# Patient Record
Sex: FEMALE | Race: BLACK OR AFRICAN AMERICAN
[De-identification: names, ages, dates, MRNs, and addresses within clinical notes are randomized per-mention and may not be internally consistent; named-entity substitution may affect disease eponyms.]

---

## 2023-10-24 ENCOUNTER — HOSPITAL ENCOUNTER (EMERGENCY)
Dept: HOSPITAL 97 - ER | Age: 23
Discharge: HOME | End: 2023-10-24
Payer: SELF-PAY

## 2023-10-24 DIAGNOSIS — D72.829: ICD-10-CM

## 2023-10-24 DIAGNOSIS — N83.299: ICD-10-CM

## 2023-10-24 DIAGNOSIS — N39.0: Primary | ICD-10-CM

## 2023-10-24 LAB
ALBUMIN SERPL BCP-MCNC: 3.9 G/DL (ref 3.4–5)
ALP SERPL-CCNC: 78 U/L (ref 45–117)
ALT SERPL W P-5'-P-CCNC: 13 U/L (ref 13–56)
AST SERPL W P-5'-P-CCNC: 12 U/L (ref 15–37)
BUN BLD-MCNC: 10 MG/DL (ref 7–18)
GLUCOSE SERPLBLD-MCNC: 112 MG/DL (ref 74–106)
HCT VFR BLD CALC: 33.8 % (ref 36–45)
LIPASE SERPL-CCNC: 17 U/L (ref 13–75)
LYMPHOCYTES # SPEC AUTO: 1.2 K/UL (ref 0.7–4.9)
MCV RBC: 78.2 FL (ref 80–100)
MORPHOLOGY BLD-IMP: (no result)
PMV BLD: 7.6 FL (ref 7.6–11.3)
POTASSIUM SERPL-SCNC: 4 MEQ/L (ref 3.5–5.1)
RBC # BLD: 4.32 M/UL (ref 3.86–4.86)
WBC # BLD AUTO: 25.2 THOU/UL (ref 4.3–10.9)

## 2023-10-24 PROCEDURE — 85025 COMPLETE CBC W/AUTO DIFF WBC: CPT

## 2023-10-24 PROCEDURE — 87088 URINE BACTERIA CULTURE: CPT

## 2023-10-24 PROCEDURE — 83690 ASSAY OF LIPASE: CPT

## 2023-10-24 PROCEDURE — 36415 COLL VENOUS BLD VENIPUNCTURE: CPT

## 2023-10-24 PROCEDURE — 87086 URINE CULTURE/COLONY COUNT: CPT

## 2023-10-24 PROCEDURE — 74177 CT ABD & PELVIS W/CONTRAST: CPT

## 2023-10-24 PROCEDURE — 80053 COMPREHEN METABOLIC PANEL: CPT

## 2023-10-24 PROCEDURE — 76830 TRANSVAGINAL US NON-OB: CPT

## 2023-10-24 PROCEDURE — 81001 URINALYSIS AUTO W/SCOPE: CPT

## 2023-10-24 PROCEDURE — 81025 URINE PREGNANCY TEST: CPT

## 2023-10-24 NOTE — EDPHYS
Physician Documentation                                                                           

 [facility]                                                                 

Name: Marybel Alamo                                                                                

Age: 22 yrs                                                                                       

Sex: Female                                                                                       

: 2000                                                                                   

MRN: [id number]                                                                                   

Arrival Date: 10/24/2023                                                                          

Time: 17:35                                                                                       

Account#: [id number]                                                                            

Bed 14                                                                                            

Private MD:                                                                                       

ED Physician Pramod Calhoun                                                                      

HPI:                                                                                              

10/24                                                                                             

21:57 This 22 yrs old Black Female presents to ER via Wheelchair with complaints of Abdominal kb  

      Pain.                                                                                       

21:57 Patient is a 22-year-old female who presents for abdominal pain that started this       kb  

      morning. States pain started in right lower quadrant is moved to the middle of her          

      abdomen. Reports nausea, denies vomiting, diarrhea and fever. Patient states she has        

      had this pain before but it resolved on its own and she did not get it checked out..        

                                                                                                  

Historical:                                                                                       

- Allergies:                                                                                      

17:42 No Known Allergies;                                                                     cm10

- Home Meds:                                                                                      

17:42 None [Active];                                                                          cm10

- PMHx:                                                                                           

17:42 None;                                                                                   cm10

- PSHx:                                                                                           

17:42 None;                                                                                   cm10

                                                                                                  

- Immunization history:: Adult Immunizations up to date.                                          

- Social history:: Smoking status: Patient reports the use of cigarette tobacco                   

  products, smokes one pack cigarettes per day.                                                   

                                                                                                  

                                                                                                  

ROS:                                                                                              

21:57 Constitutional: Negative for fever, chills, and weight loss,                            kb  

21:57 Abdomen/GI: Positive for abdominal pain, nausea,                                            

21:57 All other systems are negative,                                                             

                                                                                                  

Exam:                                                                                             

21:57 Constitutional:  This is a well developed, well nourished patient who is awake, alert,  kb  

      and in no acute distress. Head/Face:  Normocephalic, atraumatic. ENT:  Moist Mucous         

      membranes Cardiovascular:  Regular rate  Respiratory:  Respirations even and unlabored.     

      No increased work of breathing. Talking in full sentences Skin:  Warm, dry with normal      

      turgor.  Normal color. MS/ Extremity:  Pulses equal, no cyanosis.  Neurovascular            

      intact.  Full, normal range of motion. Neuro:  Awake and alert, GCS 15, oriented to         

      person, place, time, and situation. Moves all extremities. Normal gait.                     

21:57 Abdomen/GI: Inspection: abdomen appears normal, Bowel sounds: normal, Palpation:            

      moderate abdominal tenderness, in all quadrants,                                            

                                                                                                  

Vital Signs:                                                                                      

17:41  / 85; Pulse 78; Resp 18; Temp 98.8; Pulse Ox 100% on R/A; Weight 65.77 kg;       cm10

      Height 5 ft. 6 in. ; Pain 10/10;                                                            

18:00  / 93; Pulse 75; Resp 18; Pulse Ox 99% ;                                          ko1 

19:09  / 76; Pulse 91; Resp 18; Pulse Ox 100% ;                                         jw7 

20:20  / 69; Pulse 88; Resp 16; Pulse Ox 100% ;                                         jw7 

22:04  / 56; Pulse 61; Resp 16; Pulse Ox 100% on R/A;                                   kl  

17:41 Body Mass Index 23.40 (65.77 kg, 167.64 cm)                                             cm10

17:41 Pain Scale: Adult                                                                       cm10

                                                                                                  

MDM:                                                                                              

17:40 Patient medically screened.                                                             kb  

21:58 Differential diagnosis: appendicitis, bowel obstruction, non-specific abd pain, Ovarian kb  

      Torsion, Ovarian cyst. Data reviewed: vital signs, nurses notes. Counseling: I had a        

      detailed discussion with the patient and/or guardian regarding the historical points,       

      exam findings, and any diagnostic results supporting the discharge/admit diagnosis, lab     

      results, radiology results, the need for outpatient follow up, an OB/Gyne specialist,       

      to return to the emergency department if symptoms worsen or persist or if there are any     

      questions or concerns that arise at home. ED course: Patient request to be discharged       

      at this time. States she is feeling better and wants to go home. Educated that we do        

      not have the radiologist reading of ultrasound at this time but patient request to go       

      home now..                                                                                  

                                                                                                  

10/24                                                                                             

17:43 Order name: CBC with Diff; Complete Time: 18:57                                         kb  

10/24                                                                                             

17:43 Order name: CMP; Complete Time: 19:14                                                   kb  

10/24                                                                                             

17:43 Order name: Lipase; Complete Time: 19:14                                                kb  

10/24                                                                                             

17:43 Order name: Pregnancy Test, Urine; Complete Time: 18:42                                 kb  

10/24                                                                                             

17:43 Order name: Urinalysis w/ reflexes; Complete Time: 18:42                                kb  

10/24                                                                                             

18:41 Order name: Urine Culture                                                               EDMS

10/24                                                                                             

18:55 Order name: Manual Differential; Complete Time: 18:57                                   EDMS

10/24                                                                                             

17:43 Order name: CT Abd/Pelvis - IV Contrast Only; Complete Time: 20:38                      kb  

10/24                                                                                             

20:39 Order name: US Transvaginal Study (Probe)                                               kb  

10/24                                                                                             

17:43 Order name: IV Saline Lock; Complete Time: 18:16                                        kb  

10/24                                                                                             

17:43 Order name: Labs collected and sent; Complete Time: 18:16                               kb  

                                                                                                  

Administered Medications:                                                                         

18:17 Drug: NS 0.9% IV 1000 ml IV at 1 bolus Per protocol; 1000 mL bolus Route: IV; Rate: 1   ko1 

      bolus; Site: left antecubital;                                                              

18:17 Drug: TORadol - Ketorolac IVP 15 mg IVP once Route: IVP; Site: left antecubital;        ko1 

18:17 Drug: Ondansetron IVP 4 mg IVP once; over 2 minutes Route: IVP; Site: left antecubital; ko1 

                                                                                                  

                                                                                                  

Disposition Summary:                                                                              

10/24/23 21:57                                                                                    

Discharge Ordered                                                                                 

 Notes:       Location: Home                                                                        
  kb

      Condition: Stable                                                                       kb  

      Diagnosis                                                                                   

        - Other ovarian cysts                                                                 kb  

        - UTI/ Urinary tract infection, site not specified                                    kb  

        - Elevated white blood cell count                                                     kb  

      Followup:                                                                               kb  

        - With: Emergency Department                                                               

        - When: As needed                                                                          

        - Reason: Worsening of condition                                                           

      Followup:                                                                               kb  

        - With: Private Physician                                                                  

        - When: 2 - 3 days                                                                         

        - Reason: Recheck today's complaints, Continuance of care, Re-evaluation by your           

      physician                                                                                   

      Discharge Instructions:                                                                     

        - Discharge Summary Sheet                                                             kb  

        - Urinary Tract Infection, Adult, Easy-to-Read                                        kb  

        - Abdominal Pain, Adult, Easy-to-Read                                                 kb  

        - Ovarian Cyst, Easy-to-Read                                                          kb  

      Forms:                                                                                      

        - Medication Reconciliation Form                                                      kb  

        - Thank You Letter                                                                    kb  

        - Antibiotic Education                                                                kb  

        - Prescription Opioid Use                                                             kb  

        - Patient Portal Instructions                                                         kb  

        - Leadership Thank You Letter                                                         kb  

      Prescriptions:                                                                              

        - Macrobid 100 mg Oral Capsule                                                             

            - take 1 capsule ORAL route every 12 hours for 10 days; 20 capsule; Refills: 0,   kb  

      Product Selection Permitted                                                                 

Signatures:                                                                                       

Dispatcher [employer]                           Fariha Coyle, FNP-C                 FNP-Sada Lundberg RN                       RN   ko1                                                  

Jaci Quiros RN                  RN   cm10                                                 

                                                                                                  

Corrections: (The following items were deleted from the chart)                                    

21:59 21:57 Abdominal pain, Generalized kb                                                    kb  

                                                                                                  

**************************************************************************************************

## 2023-10-24 NOTE — RAD REPORT
EXAM DESCRIPTION:  CT - Abdomen   Pelvis W Contrast - 10/24/2023 7:36 pm

 

CLINICAL HISTORY:  ABD PAIN

 

COMPARISON:  No comparisons

 

TECHNIQUE:  Thin cut axial CT imaging of the abdomen and pelvis was performed following intravenous a
dministration of 100 mL Isovue 300. Multiplanar reformats were generated and reviewed.

 

All CT scans are performed using dose optimization technique as appropriate and may include automated
 exposure control or mA/KV adjustment according to patient size.

 

FINDINGS:  No suspicious findings in the lung bases.

 

The liver, spleen, adrenal glands, and pancreas show no suspicious findings. Gallbladder and biliary 
tree are also without suspicious finding.

 

Symmetric renal function is seen with no hydronephrosis or suspicious renal mass.

 

Large septated cystic lesions of both ovaries, measuring 10.1 x 5.7 cm on the right, and 10.5 x 5.1 c
m on the left. No dilated bowel loops or bowel wall thickening. No free air, free fluid or inflammato
ry stranding. No hernia, mass or bulky lymphadenopathy. The urinary bladder is without significant fi
nding.

 

No suspicious bony findings.

 

 

IMPRESSION:  Bilateral complex cystic lesions of both ovaries, exceeding 10 cm each. These could be f
unctional/physiologic. Possibility of cystic neoplasm cannot be entirely excluded. Additional evaluat
ion by pelvic ultrasound or MRI is recommended for better characterization.

## 2023-10-24 NOTE — RAD REPORT
EXAM DESCRIPTION:  US - Transvaginal Study Probe - 10/24/2023 9:38 pm

 

CLINICAL HISTORY:  ABD PAIN

 

COMPARISON:  Abdomen   Pelvis W Contrast dated 10/24/2023

 

TECHNIQUE:    Sonographic grayscale and color flow images of the pelvis were obtained.

 

FINDINGS:  The uterus is normal in size, shape and echotexture. The uterus measures 6.1 cm in length.


 

The endometrial stripe measures 1 mm, normal.

 

Both ovaries are markedly enlarged, with numerous multiloculated cystic lesions with septations.  The
 right ovary measures 9.6 x 7.0 x 7.1 cm.  The left ovary measures 9.6 x 5.3 x 4.7 cm. Cysts demonstr
ate mild complexity, with most pronounced layering debris within the dominant cyst within the right o
vary, occupying most of the ovary. No septal thickening, mural solid nodules, or other solid lesions.


 

Normal Doppler blood flow was demonstrated to both ovaries.

 

No significant pelvic ascites.

 

IMPRESSION:  Bilateral large multiloculated ovarian cysts with septations, with some debris, suggesti
ng hemorrhagic cysts. Other possibilities such as physiologic cysts and endometriomas are probably le
ss likely. Given the size of the lesions, short-term follow-up sonographic evaluation in 6-10 weeks i
s recommended.

## 2023-10-24 NOTE — ER
Nurse's Notes                                                                                     

 [facility]                                                                 

Name: Marybel Alamo                                                                                

Age: 22 yrs                                                                                       

Sex: Female                                                                                       

: 2000                                                                                   

MRN: [id number]                                                                                   

Arrival Date: 10/24/2023                                                                          

Time: 17:35                                                                                       

Account#: [id number]                                                                            

Bed 14                                                                                            

Private MD:                                                                                       

Diagnosis: Other ovarian cysts;UTI/ Urinary tract infection, site not specified;Elevated white    

  blood cell count                                                                                

                                                                                                  

Presentation:                                                                                     

10/24                                                                                             

17:41 Chief complaint: Patient states: "My stomach started hurting really bad this morning.   cm10

      The pain started on the right side and now its in the middle. I feel like i'm going to      

      throw up.". Coronavirus screen: At this time, the client does not indicate any symptoms     

      associated with coronavirus-19. Ebola Screen: No symptoms or risks identified at this       

      time. Initial Sepsis Screen: Does the patient meet any 2 criteria? No. Patient's            

      initial sepsis screen is negative. Does the patient have a suspected source of              

      infection? No. Patient's initial sepsis screen is negative. Risk Assessment: Do you         

      want to hurt yourself or someone else? Patient reports no desire to harm self or            

      others. Onset of symptoms was 2023.                                             

17:41 Method Of Arrival: Wheelchair                                                           cm10

17:41 Acuity: MELINA 3                                                                           cm10

                                                                                                  

Triage Assessment:                                                                                

17:43 General: Appears uncomfortable, Behavior is anxious, crying. Pain: Complains of pain in cm10

      abdomen Pain currently is 10 out of 10 on a pain scale. EENT: No signs and/or symptoms      

      were reported regarding the EENT system. Neuro: Robles Agitation-Sedation Scale           

      (RASS): 0 - Alert and Calm Level of Consciousness is awake, alert, obeys commands,          

      Oriented to person, place, time, situation, Appropriate for age. Cardiovascular:            

      Patient's skin is warm and dry. Respiratory: Airway is patent Respiratory effort is         

      even, unlabored, Respiratory pattern is regular, symmetrical. GI: Abdomen is flat,          

      non-distended, Abdomen is tender to palpation X 4 quads. GI: Reports nausea. : No         

      signs and/or symptoms were reported regarding the genitourinary system. Derm: Skin is       

      pink, warm \T\ dry. Musculoskeletal: Range of motion: intact in all extremities.            

                                                                                                  

Historical:                                                                                       

- Allergies:                                                                                      

17:42 No Known Allergies;                                                                     cm10

- Home Meds:                                                                                      

17:42 None [Active];                                                                          cm10

- PMHx:                                                                                           

17:42 None;                                                                                   cm10

- PSHx:                                                                                           

17:42 None;                                                                                   cm10

                                                                                                  

- Immunization history:: Adult Immunizations up to date.                                          

- Social history:: Smoking status: Patient reports the use of cigarette tobacco                   

  products, smokes one pack cigarettes per day.                                                   

                                                                                                  

                                                                                                  

Screenin:00 [facility] ED Fall Risk Assessment (Adult) History of falling in the last 3 months,       ko1 

      including since admission No falls in past 3 months (0 pts) Confusion or Disorientation     

      No (0 pts) Intoxicated or Sedated No (0 pts) Impaired Gait No (0 pts) Mobility Assist       

      Device Used No (0 pt) Altered Elimination No (0 pt) Score/Fall Risk Level 0 - 2 = Low       

      Risk Oriented to surroundings, Maintained a safe environment, Educated pt \T\ family on     

      fall prevention, incl call for assistance when getting out of bed, Assessed \T\             

      reinforced patient's understanding of fall precautions, Provided non-skid footwear,         

      Hourly rounding (assess needs \T\ fall precautionary measures) done, Used ambulatory aids   

      as needed (educated on \T\ assisted with), Used gait belt as appropriate. Abuse screen:     

      Denies threats or abuse. Denies injuries from another. Nutritional screening: No            

      deficits noted. Tuberculosis screening: No symptoms or risk factors identified.             

                                                                                                  

Assessment:                                                                                       

18:00 General: Appears distressed, uncomfortable, Behavior is cooperative, appropriate for    ko1 

      age. Pain: Complains of pain in abdomen. Neuro: No deficits noted. Cardiovascular: No       

      deficits noted. Respiratory: No deficits noted. GI: Bowel sounds present X 4 quads.         

      Reports lower abdominal pain, nausea.                                                       

18:00 GI: Abdomen is flat, non-distended. GI: Abd is soft X 4 quads. : No deficits noted.   ko1 

      EENT: No deficits noted. Derm: No deficits noted. Musculoskeletal: No deficits noted.       

19:15 Reassessment: Patient appears in no apparent distress at this time. Patient and/or      jw7 

      family updated on plan of care and expected duration. Pain level reassessed. Patient is     

      alert, oriented x 3, equal unlabored respirations, skin warm/dry/pink. Patient states       

      feeling better.                                                                             

20:55 Reassessment: Patient appears in no apparent distress at this time. No changes from     jw7 

      previously documented assessment. Patient and/or family updated on plan of care and         

      expected duration. Pain level reassessed. Patient is alert, oriented x 3, equal             

      unlabored respirations, skin warm/dry/pink.                                                 

                                                                                                  

Vital Signs:                                                                                      

17:41  / 85; Pulse 78; Resp 18; Temp 98.8; Pulse Ox 100% on R/A; Weight 65.77 kg;       cm10

      Height 5 ft. 6 in. ; Pain 10/10;                                                            

18:00  / 93; Pulse 75; Resp 18; Pulse Ox 99% ;                                          ko1 

19:09  / 76; Pulse 91; Resp 18; Pulse Ox 100% ;                                         jw7 

20:20  / 69; Pulse 88; Resp 16; Pulse Ox 100% ;                                         jw7 

22:04  / 56; Pulse 61; Resp 16; Pulse Ox 100% on R/A;                                   kl  

17:41 Body Mass Index 23.40 (65.77 kg, 167.64 cm)                                             cm10

17:41 Pain Scale: Adult                                                                       cm10

                                                                                                  

ED Course:                                                                                        

17:38 Patient arrived in ED.                                                                  kj1 

17:38 Fariha Carter FNP-C is PHCP.                                                        kb  

17:38 Pramod Calhoun MD is Attending Physician.                                             kb  

17:42 Triage completed.                                                                       cm10

17:43 Arm band placed on.                                                                     cm10

17:45 Sada Donaldson, RN is Primary Nurse.                                                     ko1 

18:00 Patient has correct armband on for positive identification. Placed in gown. Bed in low  ko1 

      position. Call light in reach. Side rails up X2. Provided Education on: na. Pulse ox        

      on. NIBP on. Door closed. Noise minimized. Lights dimmed. Warm blanket given.               

18:00 Inserted saline lock: 22 gauge in left antecubital area, using aseptic technique. Blood ko1 

      collected.                                                                                  

18:16 CBC with Diff Sent.                                                                     ko1 

18:16 CMP Sent.                                                                               ko1 

18:16 Lipase Sent.                                                                            ko1 

19:38 CT Abd/Pelvis - IV Contrast Only In Process Unspecified.                                EDMS

20:11 Primary Nurse role handed off by Sada Donaldson, RN                                      as6 

20:54 HernandezsEmily, RN is Primary Nurse.                                                       jw7 

21:30 No apparent distress. Resting quietly.                                                  kl  

21:40 US Transvaginal Study (Probe) In Process Unspecified.                                   EDMS

22:04 No provider procedures requiring assistance completed. IV discontinued, intact,         kl  

      bleeding controlled, No redness/swelling at site. Pressure dressing applied.                

                                                                                                  

Administered Medications:                                                                         

18:17 Drug: NS 0.9% IV 1000 ml IV at 1 bolus Per protocol; 1000 mL bolus Route: IV; Rate: 1   ko1 

      bolus; Site: left antecubital;                                                              

18:17 Drug: TORadol - Ketorolac IVP 15 mg IVP once Route: IVP; Site: left antecubital;        ko1 

18:17 Drug: Ondansetron IVP 4 mg IVP once; over 2 minutes Route: IVP; Site: left antecubital; ko1 

                                                                                                  

                                                                                                  

Medication:                                                                                       

18:00 VIS not applicable for this client.                                                     ko1 

                                                                                                  

Outcome:                                                                                          

21:57 Discharge ordered by MD.                                                                garima  

22:05 Discharged to home ambulatory,                                                          frankie  

22:05 Condition: stable                                                                           

22:05 Discharge instructions given to patient, Instructed on discharge instructions, follow       

      up and referral plans. medication usage, Demonstrated understanding of instructions,        

      follow-up care, medications, Prescriptions given X 1,                                       

22:05 Patient left the ED.                                                                    frankie  

                                                                                                  

Signatures:                                                                                       

Dispatcher [employer]                           EDFariha Shay, FNP-C                 FNP-CkPiper Spangler, RN                     RN   Martha Howard1                                                  

Marquis Burnett RN RN   as6                                                  

Emily Obregon RN                         RN   jw7                                                  

Sada Donaldson RN                       RN   ko1                                                  

Jaci Quiros, RN                  RN   cm10                                                 

                                                                                                  

**************************************************************************************************

## 2024-08-29 ENCOUNTER — HOSPITAL ENCOUNTER (EMERGENCY)
Dept: HOSPITAL 97 - ER | Age: 24
Discharge: ANOTHER HOSPITAL | End: 2024-08-29
Payer: COMMERCIAL

## 2024-08-29 VITALS — SYSTOLIC BLOOD PRESSURE: 108 MMHG | DIASTOLIC BLOOD PRESSURE: 64 MMHG

## 2024-08-29 VITALS — TEMPERATURE: 98 F

## 2024-08-29 VITALS — OXYGEN SATURATION: 100 %

## 2024-08-29 DIAGNOSIS — A41.9: ICD-10-CM

## 2024-08-29 DIAGNOSIS — N73.9: Primary | ICD-10-CM

## 2024-08-29 LAB
ALBUMIN SERPL BCP-MCNC: 2.5 G/DL (ref 3.4–5)
ALBUMIN/GLOB SERPL: 0.4 {RATIO} (ref 1.1–1.8)
ALP SERPL-CCNC: 172 U/L (ref 45–117)
ALT SERPL W P-5'-P-CCNC: 48 U/L (ref 13–56)
ANION GAP SERPL CALC-SCNC: 8 MEQ/L (ref 5–15)
ANISOCYTOSIS BLD QL: (no result)
AST SERPL W P-5'-P-CCNC: 48 U/L (ref 15–37)
BLD SMEAR INTERP: (no result)
BUN BLD-MCNC: 8 MG/DL (ref 7–18)
GLOBULIN SER CALC-MCNC: 7.1 G/DL (ref 2.3–3.5)
GLUCOSE SERPLBLD-MCNC: 100 MG/DL (ref 74–106)
HCT VFR BLD CALC: 24.8 % (ref 36–45)
HGB BLD-MCNC: 7.6 G/DL (ref 12–15)
HYPOCHROMIA BLD QL: (no result)
LIPASE SERPL-CCNC: 24 U/L (ref 13–75)
LYMPHOCYTES # SPEC AUTO: 3.4 K/UL (ref 0.7–4.9)
MCH RBC QN AUTO: 21.9 PG (ref 27–35)
MCHC RBC AUTO-ENTMCNC: 30.6 G/DL (ref 32–36)
MCV RBC: 71.8 FL (ref 80–100)
MICROCYTES BLD QL SMEAR: (no result)
MORPHOLOGY BLD-IMP: (no result)
NRBC # BLD: 0 10*3/UL (ref 0–0)
NRBC BLD AUTO-RTO: 0 % (ref 0–0)
PMV BLD: 6.7 FL (ref 7.6–11.3)
POLYCHROMASIA BLD QL SMEAR: (no result)
POTASSIUM SERPL-SCNC: 4 MEQ/L (ref 3.5–5.1)
RBC # BLD: 3.46 M/UL (ref 3.86–4.86)
UA COMPLETE W REFLEX CULTURE PNL UR: (no result)
UA DIPSTICK W REFLEX MICRO PNL UR: (no result)
WBC # BLD AUTO: 16 THOU/UL (ref 4.3–10.9)

## 2024-08-29 PROCEDURE — 83690 ASSAY OF LIPASE: CPT

## 2024-08-29 PROCEDURE — 93005 ELECTROCARDIOGRAM TRACING: CPT

## 2024-08-29 PROCEDURE — 87040 BLOOD CULTURE FOR BACTERIA: CPT

## 2024-08-29 PROCEDURE — 83605 ASSAY OF LACTIC ACID: CPT

## 2024-08-29 PROCEDURE — 80053 COMPREHEN METABOLIC PANEL: CPT

## 2024-08-29 PROCEDURE — 81025 URINE PREGNANCY TEST: CPT

## 2024-08-29 PROCEDURE — 85025 COMPLETE CBC W/AUTO DIFF WBC: CPT

## 2024-08-29 PROCEDURE — 36415 COLL VENOUS BLD VENIPUNCTURE: CPT

## 2024-08-29 PROCEDURE — 99285 EMERGENCY DEPT VISIT HI MDM: CPT

## 2024-08-29 PROCEDURE — 74177 CT ABD & PELVIS W/CONTRAST: CPT

## 2024-08-29 PROCEDURE — 81001 URINALYSIS AUTO W/SCOPE: CPT

## 2024-08-29 PROCEDURE — 96361 HYDRATE IV INFUSION ADD-ON: CPT

## 2024-08-29 PROCEDURE — 96365 THER/PROPH/DIAG IV INF INIT: CPT

## 2024-08-29 NOTE — EDPHYS
Physician Documentation                                                                           

 [facility]                                                                 

Name: Marybel Alamo                                                                                

Age: 23 yrs                                                                                       

Sex: Female                                                                                       

: 2000                                                                                   

MRN: [id number]                                                                                   

Arrival Date: 2024                                                                          

Time: 07:43                                                                                       

Account#: [id number]                                                                            

Bed 5                                                                                             

Private MD:                                                                                       

ED Physician oJhn Danielson                                                                     

HPI:                                                                                              

                                                                                             

08:15 This 23 yrs old Black Female presents to ER via EMS with complaints of Pelvic Pain.     rt  

08:15 Patient presents to the ED with lower abdominal pain and pelvic pain for more than a    rt  

      month. Patient was seen in the ED about a month ago, was diagnosed with cystic              

      structure, infection versus neoplasm. Was prescribed antibiotics, left. States that she     

      initially improved, however, is worsened. Reports significant weight loss, had a near       

      syncopal episode today. Denies acute complaints, symptoms are moderate in severity, no      

      other aggravating relieving factors.                                                        

                                                                                                  

OB/GYN:                                                                                           

08:33 LMP N/A - Irregular menses, Not pregnant                                                dd2 

                                                                                                  

Historical:                                                                                       

- Allergies:                                                                                      

07:54 No Known Allergies;                                                                     ll1 

- Home Meds:                                                                                      

07:54 None [Active];                                                                          ll1 

- PMHx:                                                                                           

07:54 Ovarian cyst;                                                                           ll1 

- PSHx:                                                                                           

07:54 ovarian cyst surgery;                                                                   ll1 

                                                                                                  

- Immunization history:: Adult Immunizations up to date.                                          

- Infectious Disease History:: Denies.                                                            

- Social history:: Smoking status: Patient reports the use of cigarette tobacco                   

  products, smokes one-half pack cigarettes per day.                                              

- Family history:: not pertinent.                                                                 

                                                                                                  

                                                                                                  

ROS:                                                                                              

08:15 Constitutional: Negative for fever, chills, and weight loss, Cardiovascular: Negative   rt  

      for chest pain, palpitations, and edema, Respiratory: Negative for shortness of breath,     

      cough, wheezing, and pleuritic chest pain, MS/Extremity: Negative for injury and            

      deformity, Skin: Negative for injury, rash, and discoloration,                              

08:15 Constitutional: Positive for malaise, weight loss,                                          

08:15 Abdomen/GI: Positive for abdominal pain, nausea,                                            

08:15 Neuro: Positive for near syncope, Negative for headache,                                    

                                                                                                  

Exam:                                                                                             

08:15 Constitutional:  This is a well developed, well nourished patient who is awake, alert,  rt  

      and in no acute distress. Chest/axilla:  Normal chest wall appearance and motion.           

      Nontender with no deformity.  No lesions are appreciated. Cardiovascular:  Regular rate     

      and rhythm with a normal S1 and S2.  No gallops, murmurs, or rubs.  Normal PMI, no JVD.     

       No pulse deficits. Respiratory:  Lungs have equal breath sounds bilaterally, clear to      

      auscultation and percussion.  No rales, rhonchi or wheezes noted.  No increased work of     

      breathing, no retractions or nasal flaring. Skin:  Warm, dry with normal turgor.            

      Normal color with no rashes, no lesions, and no evidence of cellulitis. MS/ Extremity:      

      Pulses equal, no cyanosis.  Neurovascular intact.  Full, normal range of motion. Neuro:     

       Awake and alert, GCS 15, oriented to person, place, time, and situation.  Cranial          

      nerves II-XII grossly intact.  Motor strength 5/5 in all extremities.  Sensory grossly      

      intact.  Cerebellar exam normal.  Normal gait.                                              

08:15 Abdomen/GI: Mild tenderness to the lower quadrants without rebound, guarding,               

      distention,                                                                                 

11:05 ECG was reviewed by the Attending Physician.                                            rt  

                                                                                                  

Vital Signs:                                                                                      

07:52  / 74; Pulse 100; Resp 16; Pulse Ox 100% ; Weight 58.97 kg; Height 5 ft. 6 in. ;  ll1 

      Pain 0/10;                                                                                  

10:30 BP 99 / 63; Pulse 83; Resp 16 S; Temp 98.2(O); Pulse Ox 100% on R/A;                    db  

11:20 BP 98 / 58; Pulse 88; Resp 16 S; Pulse Ox 98% on R/A;                                   aa5 

12:40  / 52; Pulse 78; Resp 16 S; Pulse Ox 99% on R/A;                                  aa5 

13:30 BP 92 / 53; Pulse 72; Resp 16; Temp 98.2(O); Pulse Ox 99% on R/A;                       db  

14:15  / 59; Pulse 85; Resp 16; Pulse Ox 100% ;                                         db  

15:00  / 64; Pulse 82; Resp 16; Pulse Ox 100% on R/A;                                   db  

15:32 Temp 98(O);                                                                             db  

07:52 Body Mass Index 20.98 (58.97 kg, 167.64 cm)                                             ll1 

07:52 Pain Scale: Adult                                                                       ll1 

                                                                                                  

MDM:                                                                                              

08:07 Patient medically screened.                                                             rt  

11:51 Differential Diagnosis Pelvic abscess, pelvic neoplasm. Data reviewed: vital signs,     rt  

      nurses notes, lab test result(s), EKG, radiologic studies. Consideration of                 

      Admission/Observation Escalation of care including admission/observation considered.        

      Patient requires transfer for gynecologic evaluation. I considered the following            

      discharge prescriptions or medication management in the emergency department                

      Medications were administered in the Emergency Department. See MAR. Independent             

      interpretation of the following test(s) in the Emergency Department CT Scan: My             

      interpretation is Cystic masses versus abscesses seen on my interpretation of CT scan       

      images. Test considered but Not performed: Ultrasound Abnormalities clearly seen on CT      

      scan, emergent ultrasound is not indicated. Counseling: I had a detailed discussion         

      with the patient and/or guardian regarding the historical points, exam findings, and        

      any diagnostic results supporting the discharge/admit diagnosis, lab results, radiology     

      results, the need to transfer to another facility. Response to treatment: the patient's     

      symptoms have mildly improved after treatment.                                              

11:52 Post IV fluid administration reassessment for Sepsis: Sepsis focused reassessment       rt  

      complete. Focused assessment performed: 2024 at 11:52 Lungs: noted to be         

      clear bilaterally. Cardio: Cardiovascular exam improved from previous exam. Heart rate      

      and blood pressure have improved.                                                           

                                                                                                  

                                                                                             

08:14 Order name: CBC with Diff; Complete Time: 11:38                                         rt  

                                                                                             

08:14 Order name: CMP; Complete Time: 10:32                                                   rt  

                                                                                             

08:14 Order name: Lipase; Complete Time: 10:32                                                rt  

                                                                                             

08:14 Order name: Pregnancy Test, Urine; Complete Time: 10:32                                 rt  

                                                                                             

08:14 Order name: Urinalysis w/ reflexes; Complete Time: 10:32                                rt  

                                                                                             

09:07 Order name: CBC Smear Scan; Complete Time: 11:38                                        EDMS

                                                                                             

10:32 Order name: Blood Culture Adult (2)                                                     rt  

                                                                                             

10:32 Order name: Lactate w/ 2H reflex if indic.; Complete Time: 11:38                        rt  

                                                                                             

08:14 Order name: CT Abd/Pelvis - IV Contrast Only                                            rt  

                                                                                             

08:14 Order name: IV Saline Lock; Complete Time: 08:32                                        rt  

                                                                                             

08:14 Order name: Labs collected and sent; Complete Time: 08:32                               rt  

                                                                                             

10:32 Order name: Cardiac monitoring; Complete Time: 11:12                                    rt  

                                                                                             

10:32 Order name: EKG - Nurse/Tech; Complete Time: 10:56                                      rt  

                                                                                             

10:32 Order name: IV Saline Lock - Large Bore; Complete Time: 11:12                           rt  

                                                                                             

10:32 Order name: O2 Per Protocol; Complete Time: 10:                                       rt  

                                                                                             

10:32 Order name: O2 Sat Monitoring; Complete Time: 10:                                     rt  

                                                                                             

10:32 Order name: Vital Signs; Complete Time: 10:                                           rt  

                                                                                                  

EC:05 Rate is 78 beats/min. Rhythm is regular, Normal Sinus Rhythm with No ectopy. QRS Axis   rt  

      is Normal. PA interval is normal. QRS interval is normal. QT interval is normal. No Q       

      waves. T waves are Normal. No ST changes noted. Interpreted by me.                          

                                                                                                  

Administered Medications:                                                                         

08:35 Drug: NS 0.9% IV 1000 ml IV at 1 bolus Per protocol; 1000 mL bolus Route: IV; Rate: 1   aa5 

      bolus; Site: left antecubital;                                                              

09:30 Follow up: IV Status: Completed infusion; IV Intake: 1000ml                             aa5 

09:43 Not Given (Patient Refused): TORadol - [id number] mg IVP once                          aa5 

09:43 Not Given (Patient Refused): ondansetron 4 mg IVP once; over 2 minutes                  aa5 

09:43 Not Given (Patient Refused): morphineor iv 4 mg IVP once over 4 mins                    aa5 

11:24 Drug: Piperacillin-Tazobactam IVPB 3.375 grams IVPB once over 60 mins; (mix in    db  

      mL) Route: IVPB; Infused Over: 60 mins; Site: left antecubital;                             

11:43 Follow up: Response: No adverse reaction                                                aa5 

12:24 Follow up: IV Status: Completed infusion; IV Intake: 100ml                              aa5 

11:43 Drug: NS 0.9% IV 1000 ml IV at 1 bolus Per protocol; 1000 mL bolus Route: IV; Rate: 1   aa5 

      bolus; Site: right antecubital;                                                             

12:45 Follow up: Response: No change in condition; IV Status: Completed infusion; IV Intake:  db  

      100ml                                                                                       

                                                                                                  

                                                                                                  

Disposition Summary:                                                                              

24 11:43                                                                                    

Transfer Ordered                                                                                  

 Notes:       Reason: Higher level of care                                                          
  rt

      Condition: Stable                                                                       rt  

      Problem: an ongoing problem                                                             rt  

      Symptoms: are unchanged                                                                 rt  

      Transfer Location: [facility](24 13:43)                                           rt  

      Accepting Physician: (24 16:01)                                                aa5 

      Diagnosis                                                                                   

        - Pelvic abscess                                                                      rt  

        - Sepsis                                                                              rt  

      Forms:                                                                                      

        - Medication Reconciliation Form                                                      rt  

        - SBAR form                                                                           rt  

Critical care time excluding procedures:                                                          

14:22 Critical care time: Bedside Care: 30 minutes, Consultation: 15 minutes. Total time: 45  rt  

      minutes                                                                                     

                                                                                                  

Signatures:                                                                                       

Dispatcher [employer]                           Laura Guillen, RN                     RN   aa5                                                  

Kenton Gordon RN                       RN   ll1                                                  

Lanny Batista RN                    RN   db                                                   

John Danielson MD MD   rt                                                   

DEBORA HUTCHINSON RN                        RN   dd2                                                  

                                                                                                  

Corrections: (The following items were deleted from the chart)                                    

10:41 10:32 Imeldauchlatoya laurent. rt                                                             aa5 

13:43 11:43  rt                                                                            rt  

13:43 11:43 Other [facility] rt                                                       rt  

16:01 13:43  rt                                                                            aa5 

                                                                                                  

**************************************************************************************************

## 2024-08-29 NOTE — RAD REPORT
EXAM DESCRIPTION:  CTAbdomen   Pelvis W Contrast - 8/29/2024 9:05 am

 

CLINICAL HISTORY:  Abdominal pain.

ABD PAIN

 

COMPARISON:  Abdomen   Pelvis W Contrast dated 7/27/2024; Abdomen   Pelvis W Contrast dated 10/24/202
3

 

TECHNIQUE:  Biphasic CT imaging of the abdomen and pelvis was performed with 100 ml non-ionic IV cont
rast.

 

All CT scans are performed using dose optimization technique as appropriate and may include automated
 exposure control or mA/KV adjustment according to patient size.

 

FINDINGS:  The lung bases are clear.

 

The liver, spleen, pancreas, adrenal glands and kidneys are within normal limits.

 

No bowel obstruction, free air, free fluid or abscess. Normal-appearing appendix. Large thick walled 
cystic collections again seen in the pelvis, the largest the left measuring up to 10 cm. The superior
 slightly smaller than on the comparative study but are still quite large and significant. These meche
ections are thick-walled and containing air and fluid suggesting infection however neoplastic process
 is also within the differential. No evidence of significant lymphadenopathy.

 

No suspicious bony findings.

 

IMPRESSION:  Again noted are very large and thick wall cystic pelvic masses, measuring up to 10 cm on
 the left. 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
plasm.

 

Elsewhere, no acute finding is seen.

## 2024-08-29 NOTE — ER
Nurse's Notes                                                                                     

 [facility]                                                                 

Name: Marybel Alamo                                                                                

Age: 23 yrs                                                                                       

Sex: Female                                                                                       

: 2000                                                                                   

MRN: [id number]                                                                                   

Arrival Date: 2024                                                                          

Time: 07:43                                                                                       

Account#: [id number]                                                                            

Bed 5                                                                                             

Private MD:                                                                                       

Diagnosis: Pelvic abscess;Sepsis                                                                  

                                                                                                  

Presentation:                                                                                     

                                                                                             

07:52 Chief complaint: Patient states: Pelvic pain for months. States she had a near syncope  ll1 

      event this morning. Has had pelvic infections recently. Coronavirus screen: Client          

      denies travel out of the [place] in the last 14 days. At this time, the client does not        

      indicate any symptoms associated with coronavirus-19. Ebola Screen: Patient denies          

      travel to an Ebola-affected area in the 21 days before illness onset. Initial Sepsis        

      Screen: Does the patient meet any 2 criteria? No. Patient's initial sepsis screen is        

      negative. Does the patient have a suspected source of infection? No. Patient's initial      

      sepsis screen is negative. Risk Assessment: Do you want to hurt yourself or someone         

      else? Patient reports no desire to harm self or others. Onset of symptoms was 2024.                                                                                   

07:52 Method Of Arrival: EMS: [facility]                                                    ll1 

07:52 Acuity: MELINA 3                                                                           ll1 

                                                                                                  

OB/GYN:                                                                                           

08:33 LMP N/A - Irregular menses, Not pregnant                                                dd2 

                                                                                                  

Historical:                                                                                       

- Allergies:                                                                                      

07:54 No Known Allergies;                                                                     ll1 

- Home Meds:                                                                                      

07:54 None [Active];                                                                          ll1 

- PMHx:                                                                                           

07:54 Ovarian cyst;                                                                           ll1 

- PSHx:                                                                                           

07:54 ovarian cyst surgery;                                                                   ll1 

                                                                                                  

- Immunization history:: Adult Immunizations up to date.                                          

- Infectious Disease History:: Denies.                                                            

- Social history:: Smoking status: Patient reports the use of cigarette tobacco                   

  products, smokes one-half pack cigarettes per day.                                              

- Family history:: not pertinent.                                                                 

                                                                                                  

                                                                                                  

Screenin:33 [facility] ED Fall Risk Assessment (Adult) History of falling in the last 3 months,       dd2 

      including since admission No falls in past 3 months (0 pts) Confusion or Disorientation     

      No (0 pts) Intoxicated or Sedated No (0 pts) Impaired Gait No (0 pts) Mobility Assist       

      Device Used No (0 pt) Altered Elimination No (0 pt) Score/Fall Risk Level 0 - 2 = Low       

      Risk Oriented to surroundings, Maintained a safe environment, Hourly rounding (assess       

      needs \T\ fall precautionary measures) done. Abuse screen: Denies threats or abuse.         

      Nutritional screening: No deficits noted. Tuberculosis screening: No symptoms or risk       

      factors identified.                                                                         

                                                                                                  

Assessment:                                                                                       

08:00 General: Appears comfortable, Behavior is calm, cooperative. Pain: Denies pain. Neuro:  aa5 

      Level of Consciousness is awake, alert, obeys commands, Oriented to person, place,          

      time, situation. Cardiovascular: Heart tones S1 S2 present Rhythm is regular.               

      Respiratory: Airway is patent Respiratory effort is even, unlabored, Respiratory            

      pattern is regular, symmetrical. GI: No signs and/or symptoms were reported involving       

      the gastrointestinal system. Patient currently denies nausea, vomiting. : Denies          

      burning with urination, discharge, inability to void, urinary frequency, vaginal            

      bleeding. EENT: No signs and/or symptoms were reported regarding the EENT system. Derm:     

      Skin is dry, Skin is pale, Skin temperature is warm. Musculoskeletal: Range of motion:      

      intact in all extremities.                                                                  

08:33 General: Appears in no apparent distress. Behavior is calm, cooperative. Pain:          dd2 

      Complains of pain in suprapubic area. Neuro: Level of Consciousness is awake, alert,        

      obeys commands, Oriented to person, place, time, situation, Moves all extremities.          

      Cardiovascular: Denies chest pain, Patient's skin is warm and dry. Respiratory: Airway      

      is patent. GI: Abdomen is flat, Abdomen is tender to palpation in suprapubic area           

      Reports nausea. : Urine is liudmila Reports pain in suprapubic area. EENT: No deficits       

      noted. No signs and/or symptoms were reported regarding the EENT system. Derm: Skin is      

      pale, Skin temperature is warm. Musculoskeletal: Range of motion: intact in all             

      extremities.                                                                                

08:36 Reassessment: Pt currently refused medication for pain and nausea. .                    aa5 

10:30 Neuro: Level of Consciousness is awake, alert, obeys commands, Oriented to person,      aa5 

      place, time, situation. Respiratory: Airway is patent Respiratory effort is even,           

      unlabored, Respiratory pattern is regular, symmetrical. Derm: Skin is dry, Skin is          

      pale, Skin temperature is warm.                                                             

11:42 Neuro: Level of Consciousness is awake, alert, obeys commands, Oriented to person,      aa5 

      place, time, situation. Respiratory: Airway is patent Respiratory effort is even,           

      unlabored, Respiratory pattern is regular, symmetrical. Derm: Skin is dry, Skin is          

      pale, Skin temperature is warm.                                                             

11:42 Reassessment: Pt sitting up in bed, denies any complaints at this time, pt's family at  [facility] 

      bedside. .                                                                                  

12:30 Neuro: Level of Consciousness is awake, alert, obeys commands, Oriented to person,      aa 

      place, time, situation. Respiratory: Airway is patent Respiratory effort is even,           

      unlabored, Respiratory pattern is regular, symmetrical. Derm: Skin is dry, Skin is          

      pale, Skin temperature is warm.                                                             

13:30 Neuro: Level of Consciousness is awake, alert, obeys commands, Oriented to person,      aa5 

      place, time, situation. Respiratory: Airway is patent Respiratory effort is even,           

      unlabored, Respiratory pattern is regular, symmetrical. Derm: Skin is dry, Skin is          

      pale, Skin temperature is warm.                                                             

13:30 Reassessment: Pt sitting up in bed. Pt's family at bedside. Pending transfer to another [facility] 

      facility. .                                                                                 

15:22 Reassessment: Patient appears in no apparent distress at this time. Patient and/or      db  

      family updated on plan of care and expected duration. Pain level reassessed. Patient is     

      alert, oriented x 3, equal unlabored respirations, skin warm/dry/pink.                      

15:22 Reassessment: CALLED [facility] TO GIVE PT REPORT. PHONE WAS DISCONNECTED. WILL CALL BACK.     db  

15:25 Reassessment: CALLED TO GIVE PT REPORT.                                                 db  

15:34 Reassessment: REPORT GIVEN TO JITENDRA VAUGHN.                                              db  

                                                                                                  

Vital Signs:                                                                                      

07:52  / 74; Pulse 100; Resp 16; Pulse Ox 100% ; Weight 58.97 kg; Height 5 ft. 6 in. ;  ll1 

      Pain 0/10;                                                                                  

10:30 BP 99 / 63; Pulse 83; Resp 16 S; Temp 98.2(O); Pulse Ox 100% on R/A;                    db  

11:20 BP 98 / 58; Pulse 88; Resp 16 S; Pulse Ox 98% on R/A;                                   aa5 

12:40  / 52; Pulse 78; Resp 16 S; Pulse Ox 99% on R/A;                                  aa5 

13:30 BP 92 / 53; Pulse 72; Resp 16; Temp 98.2(O); Pulse Ox 99% on R/A;                       db  

14:15  / 59; Pulse 85; Resp 16; Pulse Ox 100% ;                                         db  

15:00  / 64; Pulse 82; Resp 16; Pulse Ox 100% on R/A;                                   db  

15:32 Temp 98(O);                                                                             db  

07:52 Body Mass Index 20.98 (58.97 kg, 167.64 cm)                                             ll1 

07:52 Pain Scale: Adult                                                                       ll1 

                                                                                                  

ED Course:                                                                                        

07:49 Patient arrived in ED.                                                                  ra3 

07:50 John Danielson MD is Attending Physician.                                            rt  

07:54 Triage completed.                                                                       ll1 

07:55 Arm band placed on.                                                                     ll1 

08:10 Laura Ferreira, RN is Primary Nurse.                                                   aa5 

08:32 CBC with Diff Sent.                                                                     dd2 

08:32 CMP Sent.                                                                               dd2 

08:32 Lipase Sent.                                                                            dd2 

08:32 Pregnancy Test, Urine Sent.                                                             dd2 

08:32 Urinalysis w/ reflexes Sent.                                                            dd2 

08:33 Patient has correct armband on for positive identification. Bed in low position. Call   dd2 

      light in reach. Side rails up X 1. Provided Education on: call light, labs, procedures,     

      medications. Door closed. Warm blanket given. Verbal reassurance given.                     

08:33 No provider procedures requiring assistance completed. Initial lab(s) drawn, by me,     dd2 

      sent to lab. Urine collected: clean catch specimen, liudmila colored. Inserted saline          

      lock: 20 gauge in left antecubital area, using aseptic technique. Blood collected.          

      Flushed with 10 mL NS.                                                                      

09:06 CT Abd/Pelvis - IV Contrast Only In Process Unspecified.                                EDMS

10:53 First set of blood cultures drawn by me.                                                bc6 

10:56 Blood Culture Adult (2) Sent.                                                           bc6 

10:56 Lactate w/ 2H reflex if indic. Sent.                                                    bc6 

11:05 Second set of blood cultures drawn by me.                                               bc6 

11:42 spoke with Sylvie at [facility] Transfer center to initiate to [facility].                  bc6 

13:14 PT Was declined at [facility] due to capacity. Initiated transfer with [facility] transfer center  bc6 

      spoke with Pierce.                                                                         

13:20 Pt was declined at [facility] due to capacity.                                                bc6 

13:27 Initiated transfer with [facility] transfer center spoke with Jo. \T\[id number] Doc to Doc was     bc6 

      initated with Dr. Danielson and Gyno from [facility].                               

15:48 [facility] here for transfer.                                                                bc6 

15:55 Patient transferred, IV remains in place.                                               aa5 

                                                                                                  

Administered Medications:                                                                         

08:35 Drug: NS 0.9% IV 1000 ml IV at 1 bolus Per protocol; 1000 mL bolus Route: IV; Rate: 1   aa5 

      bolus; Site: left antecubital;                                                              

09:30 Follow up: IV Status: Completed infusion; IV Intake: 1000ml                             aa5 

09:43 Not Given (Patient Refused): TORadol - [id number] mg IVP once                          aa5 

09:43 Not Given (Patient Refused): ondansetron 4 mg IVP once; over 2 minutes                  aa5 

09:43 Not Given (Patient Refused): morphineor iv 4 mg IVP once over 4 mins                    aa5 

11:24 Drug: Piperacillin-Tazobactam IVPB 3.375 grams IVPB once over 60 mins; (mix in    db  

      mL) Route: IVPB; Infused Over: 60 mins; Site: left antecubital;                             

11:43 Follow up: Response: No adverse reaction                                                aa5 

12:24 Follow up: IV Status: Completed infusion; IV Intake: 100ml                              aa5 

11:43 Drug: NS 0.9% IV 1000 ml IV at 1 bolus Per protocol; 1000 mL bolus Route: IV; Rate: 1   aa5 

      bolus; Site: right antecubital;                                                             

12:45 Follow up: Response: No change in condition; IV Status: Completed infusion; IV Intake:  db  

      100ml                                                                                       

                                                                                                  

                                                                                                  

Medication:                                                                                       

08:33 VIS not applicable for this client.                                                     dd2 

                                                                                                  

Intake:                                                                                           

09:30 IV: 1000ml; Total: 1000ml.                                                              aa5 

12:24 IV: 100ml; Total: 1100ml.                                                               aa5 

12:45 IV: 100ml; Total: 1200ml.                                                               db  

                                                                                                  

Outcome:                                                                                          

11:43 ER care complete, transfer ordered by MD.                                               rt  

15:55 Transferred by ground EMS Transfer form completed. X-rays sent w/ patient. Note:        aa5 

      [facility] Report given to [facility]                       

15:55 Condition: stable                                                                           

15:55 Instructed on the need for transfer, Demonstrated understanding of instructions,            

16:01 Patient left the ED.                                                                    aa5 

                                                                                                  

Signatures:                                                                                       

Dispatcher [employer]                           EDMS                                                 

Laura Ferreira RN RN   aa5                                                  

Kenton Gordon RN                       RN   ll1                                                  

Lanny Batista RN                    RN   db                                                   

John Danielson MD MD   rt                                                   

Lacie Rodriguez                           bc6                                                  

Vanessa Manjarrez                                   ra3                                                  

DEBORA HUTCHINSON, RN                        RN   dd2                                                  

                                                                                                  

Corrections: (The following items were deleted from the chart)                                    

15:31 10:30 BP 99 / 63; Pulse 83bpm; Resp 16bpm; Spontaneous; Pulse Ox 100% RA; aa5           db  

15:33 13:30 BP 92 / 53; Pulse 72bpm; Resp 16bpm; Pulse Ox 99% RA; db                          db  

                                                                                                  

**************************************************************************************************

## 2024-08-30 NOTE — EKG
Test Date:    2024-08-29               Test Time:    11:02:55

Technician:   DANISHA                                    

                                                     

MEASUREMENT RESULTS:                                       

Intervals:                                           

Rate:         78                                     

GA:           130                                    

QRSD:         68                                     

QT:           424                                    

QTc:          483                                    

Axis:                                                

P:            83                                     

GA:           130                                    

QRS:          76                                     

T:            64                                     

                                                     

INTERPRETIVE STATEMENTS:                                       

                                                     

Normal sinus rhythm

Prolonged QT

Abnormal ECG

No previous ECG available for comparison



Electronically Signed On 08-30-24 14:38:49 CDT by Parag Roman